# Patient Record
Sex: MALE
[De-identification: names, ages, dates, MRNs, and addresses within clinical notes are randomized per-mention and may not be internally consistent; named-entity substitution may affect disease eponyms.]

---

## 2017-05-31 ENCOUNTER — HOSPITAL ENCOUNTER (EMERGENCY)
Dept: HOSPITAL 42 - ED | Age: 22
Discharge: HOME | End: 2017-05-31
Payer: COMMERCIAL

## 2017-05-31 VITALS
HEART RATE: 70 BPM | OXYGEN SATURATION: 98 % | SYSTOLIC BLOOD PRESSURE: 118 MMHG | DIASTOLIC BLOOD PRESSURE: 76 MMHG | RESPIRATION RATE: 18 BRPM

## 2017-05-31 VITALS — BODY MASS INDEX: 22.1 KG/M2

## 2017-05-31 VITALS — TEMPERATURE: 98.3 F

## 2017-05-31 DIAGNOSIS — H10.213: Primary | ICD-10-CM

## 2017-05-31 NOTE — ED PDOC
Arrival/HPI





- General


Chief Complaint: Eye Problem


Time Seen by Provider: 05/31/17 02:28


Historian: Patient





- History of Present Illness


Narrative History of Present Illness (Text): 


05/31/17 02:34


Gibran Aj is a 21 year old male who presents to the emergency department 

complaining of bilateral eye irritation. Patient states while at home tonight 

some one sprayed air freshener and it landed in his eyes. Patient states he 

began experiencing bilateral eye irritation with vision blurriness. Patient 

states he washed his eyes with water at home and vision blurriness resolved but 

eye irritation is still present. Patient denies any eye discharge, headache, 

dizziness, fever, chills, nausea, vomiting, or any other complaints.


Time/Duration: Other (tonight)


Symptom Onset: Gradual


Symptom Course: Unchanged


Activities at Onset: Light


Context: Home





Past Medical History





- Provider Review


Nursing Documentation Reviewed: Yes





- Past History


Past History: No Previous





- Tetanus Immunization


Tetanus Immunization: Unknown





- Musculoskeletal/Rheumatological


Hx Musculoskeletal Disorders: Yes


Hx Back Pain: Yes





- Psychiatric


Hx Substance Use: Yes (marijuana occasional)





- Past Surgical History


Past Surgical History: No Previous





- Suicidal Assessment


Feels Threatened In Home Enviroment: No





Family/Social History





- Physician Review


Nursing Documentation Reviewed: Yes


Family/Social History: No Known Family HX


Smoking Status: Current Some Days Smoker


Hx Alcohol Use: Yes


Frequency of alcohol use: Socially


Hx Substance Use: Yes (marijuana occasional)


Hx Substance Use Treatment: No





Allergies/Home Meds


Allergies/Adverse Reactions: 


Allergies





erythromycin base Allergy (Verified 02/15/17 11:53)


 RASH


Penicillins Allergy (Verified 02/15/17 11:53)


 RASH











Review of Systems





- Physician Review


All systems were reviewed & negative as marked: Yes





- Review of Systems


Constitutional: Normal.  absent: Fevers


Eyes: Vision Changes (+blurriness), Other (+bilateral eye irritation)


ENT: Normal


Respiratory: Normal.  absent: SOB, Cough


Cardiovascular: Normal.  absent: Chest Pain


Gastrointestinal: Normal.  absent: Abdominal Pain, Diarrhea, Nausea, Vomiting


Genitourinary Male: Normal.  absent: Dysuria, Frequency, Hematuria, Urinary 

Output Changes


Musculoskeletal: Normal.  absent: Back Pain, Neck Pain


Skin: Normal.  absent: Rash


Neurological: Normal.  absent: Headache, Dizziness


Endocrine: Normal


Hemo/Lymphatic: Normal


Psychiatric: Normal





Physical Exam


Vital Signs Reviewed: Yes


Vital Signs











  Temp Pulse Resp BP Pulse Ox


 


 05/31/17 01:51  98.3 F  73  16  125/75  97











Temperature: Afebrile


Blood Pressure: Normal


Pulse: Regular


Respiratory Rate: Normal


Appearance: Positive for: Well-Appearing, Non-Toxic, Comfortable


Pain Distress: None


Mental Status: Positive for: Alert and Oriented X 3





- Systems Exam


Head: Present: Atraumatic, Normocephalic


Pupils: Present: PERRL


Extroacular Muscles: Present: EOMI


Conjunctiva: Present: Normal


Mouth: Present: Moist Mucous Membranes


Neck: Present: Normal Range of Motion.  No: Meningeal Signs, MIDLINE TENDERNESS

, Paraspinal Tenderness


Respiratory/Chest: Present: Clear to Auscultation, Good Air Exchange.  No: 

Respiratory Distress, Accessory Muscle Use


Cardiovascular: Present: Regular Rate and Rhythm, Normal S1, S2.  No: Murmurs


Neurological: Present: GCS=15, CN II-XII Intact, Speech Normal


Skin: Present: Warm, Dry, Normal Color.  No: Rashes


Psychiatric: Present: Alert, Oriented x 3, Normal Insight, Normal Concentration





Medical Decision Making


ED Course and Treatment: 


05/31/17 02:34


Impression:


21 year old male complaining of bilateral eye irritation.





Plan:


-- Reassess and disposition





Progress Notes:


Tetracaine applied and eyes were thoroughly flushed with normal saline. Pt 

tolerated procedure well without complications.





- Scribe Statement


The provider has reviewed the documentation as recorded by the Scribe


Dagmar Etienne


All medical record entries made by the Scribe were at my direction and 

personally dictated by me. I have reviewed the chart and agree that the record 

accurately reflects my personal performance of the history, physical exam, 

medical decision making, and the department course for this patient. I have 

also personally directed, reviewed, and agree with the discharge instructions 

and disposition.








Disposition/Present on Arrival





- Present on Arrival


Any Indicators Present on Arrival: No


History of DVT/PE: No


History of Uncontrolled Diabetes: No


Urinary Catheter: No


History of Decub. Ulcer: No


History Surgical Site Infection Following: None





- Disposition


Have Diagnosis and Disposition been Completed?: Yes


Diagnosis: 


 Chemical conjunctivitis of both eyes





Disposition: HOME/ ROUTINE


Disposition Time: 03:37


Patient Plan: Discharge


Condition: GOOD


Additional Instructions: 


Medication as prescribed/follow up with the opthalmologist this week 


Prescriptions: 


Tobramycin 0.3% [Tobrex 0.3% Ophth Soln] 1 - 2 drop OU QID #1 bottle


Referrals: 


Surinder Faith MD [Staff Provider] - Follow up with primary

## 2017-08-21 ENCOUNTER — HOSPITAL ENCOUNTER (EMERGENCY)
Dept: HOSPITAL 42 - ED | Age: 22
Discharge: HOME | End: 2017-08-21
Payer: COMMERCIAL

## 2017-08-21 VITALS — TEMPERATURE: 98.1 F

## 2017-08-21 VITALS
DIASTOLIC BLOOD PRESSURE: 87 MMHG | RESPIRATION RATE: 16 BRPM | SYSTOLIC BLOOD PRESSURE: 132 MMHG | HEART RATE: 72 BPM | OXYGEN SATURATION: 97 %

## 2017-08-21 VITALS — BODY MASS INDEX: 19.2 KG/M2

## 2017-08-21 DIAGNOSIS — F19.10: Primary | ICD-10-CM

## 2017-08-21 LAB
ALBUMIN/GLOB SERPL: 1.6 {RATIO} (ref 1.1–1.8)
ALP SERPL-CCNC: 43 U/L (ref 38–133)
ALT SERPL-CCNC: 35 U/L (ref 7–56)
APPEARANCE UR: CLEAR
AST SERPL-CCNC: 25 U/L (ref 15–59)
BASOPHILS # BLD AUTO: 0.02 K/MM3 (ref 0–2)
BASOPHILS NFR BLD: 0.2 % (ref 0–3)
BILIRUB SERPL-MCNC: 0.5 MG/DL (ref 0.2–1.3)
BILIRUB UR-MCNC: NEGATIVE MG/DL
BUN SERPL-MCNC: 16 MG/DL (ref 7–21)
CALCIUM SERPL-MCNC: 9.5 MG/DL (ref 8.4–10.5)
CHLORIDE SERPL-SCNC: 101 MMOL/L (ref 95–110)
CO2 SERPL-SCNC: 27 MMOL/L (ref 21–33)
COLOR UR: YELLOW
EOSINOPHIL # BLD: 0.2 10*3/UL (ref 0–0.7)
EOSINOPHIL NFR BLD: 2.3 % (ref 1.5–5)
ERYTHROCYTE [DISTWIDTH] IN BLOOD BY AUTOMATED COUNT: 12.6 % (ref 11.5–14.5)
GLOBULIN SER-MCNC: 2.9 GM/DL
GLUCOSE SERPL-MCNC: 82 MG/DL (ref 70–110)
GLUCOSE UR STRIP-MCNC: NEGATIVE MG/DL
GRANULOCYTES # BLD: 3.65 10*3/UL (ref 1.4–6.5)
GRANULOCYTES NFR BLD: 39.5 % (ref 50–68)
HCT VFR BLD CALC: 42.6 % (ref 42–52)
KETONES UR STRIP-MCNC: NEGATIVE MG/DL
LEUKOCYTE ESTERASE UR-ACNC: NEGATIVE LEU/UL
LYMPHOCYTES # BLD: 4.6 10*3/UL (ref 1.2–3.4)
LYMPHOCYTES NFR BLD AUTO: 49.6 % (ref 22–35)
MCH RBC QN AUTO: 30.7 PG (ref 25–35)
MCHC RBC AUTO-ENTMCNC: 34 G/DL (ref 31–37)
MCV RBC AUTO: 90.3 FL (ref 80–105)
MONOCYTES # BLD AUTO: 0.8 10*3/UL (ref 0.1–0.6)
MONOCYTES NFR BLD: 8.4 % (ref 1–6)
PH UR STRIP: 6 [PH] (ref 4.7–8)
PLATELET # BLD: 311 10^3/UL (ref 120–450)
PMV BLD AUTO: 8.7 FL (ref 7–11)
POTASSIUM SERPL-SCNC: 3.9 MMOL/L (ref 3.6–5)
PROT SERPL-MCNC: 7.5 G/DL (ref 5.8–8.3)
PROT UR STRIP-MCNC: NEGATIVE MG/DL
RBC # UR STRIP: NEGATIVE /UL
SODIUM SERPL-SCNC: 140 MMOL/L (ref 132–148)
SP GR UR STRIP: 1.01 (ref 1–1.03)
UROBILINOGEN UR STRIP-ACNC: 0.2 E.U./DL
WBC # BLD AUTO: 9.2 10^3/UL (ref 4.5–11)

## 2017-08-21 NOTE — ED PDOC
Arrival/HPI





- General


Chief Complaint: Substance Abuse


Time Seen by Provider: 08/21/17 18:28


Historian: Patient, Police





- History of Present Illness


Narrative History of Present Illness (Text): 


08/21/17 18:52


A 21 year old male was brought into the emergency department by [facility] 

for further evaluation. Patient was found by police sleeping in his car. 

Patient reports he was smoking marijuana and felt tired. Patient denies any 

physical complaints. Patient denies any fever, chills, nausea, vomiting, 

abdominal pain, chest pain, shortness of breath, suicidal ideation, homicidal 

ideation or any other complaints. 





Time/Duration: Prior to Arrival





Past Medical History





- Provider Review


Nursing Documentation Reviewed: Yes





- Past History


Past History: No Previous





- Infectious Disease


Hx of Infectious Diseases: None





- Tetanus Immunization


Tetanus Immunization: Unknown





- Musculoskeletal/Rheumatological


Hx Musculoskeletal Disorders: Yes


Hx Back Pain: Yes





- Psychiatric


Hx Depression: No


Hx Emotional Abuse: No


Hx Physical Abuse: No


Hx Substance Use: Yes (marijuana occasional)





- Past Surgical History


Past Surgical History: No Previous





- Anesthesia


Hx Anesthesia: No





- Suicidal Assessment


Feels Threatened In Home Enviroment: No





Family/Social History





- Physician Review


Nursing Documentation Reviewed: Yes


Family/Social History: No Known Family HX


Smoking Status: Current Some Days Smoker


Hx Alcohol Use: Yes


Frequency of alcohol use: Socially


Hx Substance Use: Yes (marijuana occasional)


Hx Substance Use Treatment: No





Allergies/Home Meds


Allergies/Adverse Reactions: 


Allergies





erythromycin base Allergy (Verified 02/15/17 11:53)


 RASH


Penicillins Allergy (Verified 02/15/17 11:53)


 RASH








Home Medications: 


 Home Meds











 Medication  Instructions  Recorded  Confirmed


 


No Known Home Med  08/21/17 08/21/17














Review of Systems





- Physician Review


All systems were reviewed & negative as marked: Yes





- Review of Systems


Constitutional: Normal.  absent: Fevers, Night Sweats


Respiratory: absent: SOB


Cardiovascular: absent: Chest Pain


Gastrointestinal: absent: Abdominal Pain, Nausea, Vomiting


Psychiatric: absent: Suicidal Ideation (/Homicidal ideation)





Physical Exam


Vital Signs











  Temp Pulse Resp BP Pulse Ox


 


 08/21/17 18:59  98.1 F  75  20  134/78  99











Appearance: Positive for: Well-Appearing, Non-Toxic, Comfortable


Pain Distress: None


Mental Status: Positive for: Alert and Oriented X 3, other (Mildly somnolent)





- Systems Exam


Head: Present: Atraumatic, Normocephalic


Pupils: Present: Other (mildly constricted)


Extroacular Muscles: Present: EOMI


Conjunctiva: Present: Normal


Mouth: Present: Moist Mucous Membranes


Neck: Present: Normal Range of Motion


Respiratory/Chest: Present: Clear to Auscultation, Good Air Exchange.  No: 

Respiratory Distress, Accessory Muscle Use


Cardiovascular: Present: Regular Rate and Rhythm, Normal S1, S2.  No: Murmurs


Abdomen: Present: Normal Bowel Sounds.  No: Tenderness, Distention, Peritoneal 

Signs


Back: Present: Normal Inspection


Upper Extremity: Present: Normal Inspection.  No: Cyanosis, Edema


Lower Extremity: Present: Normal Inspection.  No: Edema


Neurological: Present: GCS=15, CN II-XII Intact, Speech Normal


Skin: Present: Warm, Dry, Normal Color.  No: Rashes


Psychiatric: Present: Alert, Oriented x 3, Normal Insight, Normal Concentration

, Other (mildly somnolent)





Medical Decision Making


ED Course and Treatment: 


08/21/17 18:52


Impression:


A 21 year old male brought in by [facility] for further evaluation. Patient 

was smoking marijuana and was found sleeping in his car.





Plan:


-- Labs


-- Reassess and disposition





Progress Notes:





08/21/17 22:16


pt observed awkae, alert clinically sober. able to make phone call in er. in 

nad. ambuatlory steady gait. oriented x 3, will d/c. no si/hi/hallucinations





- Lab Interpretations


Lab Results: 








 08/21/17 19:05 





 08/21/17 19:05 





 Lab Results





08/21/17 19:05: Alcohol, Quantitative < 10


08/21/17 19:05: Salicylates < 1 L, Acetaminophen < 10.0 L


08/21/17 19:05: Sodium 140, Potassium 3.9, Chloride 101, Carbon Dioxide 27, 

Anion Gap 16, BUN 16, Creatinine 0.8, Est GFR (African Amer) > 60, Est GFR (Non-

Af Amer) > 60, Random Glucose 82, Calcium 9.5, Total Bilirubin 0.5, AST 25, ALT 

35, Alkaline Phosphatase 43, Total Protein 7.5, Albumin 4.6, Globulin 2.9, 

Albumin/Globulin Ratio 1.6


08/21/17 19:05: WBC 9.2, RBC 4.72, Hgb 14.5, Hct 42.6, MCV 90.3, MCH 30.7, MCHC 

34.0, RDW 12.6, Plt Count 311, MPV 8.7, Gran % 39.5 L, Lymph % (Auto) 49.6 H, 

Mono % (Auto) 8.4 H, Eos % (Auto) 2.3, Baso % (Auto) 0.2, Gran # 3.65, Lymph # 

4.6 H, Mono # 0.8 H, Eos # 0.2, Baso # 0.02, Corrected WBC (Man) Cancelled, 

Neutrophils % (Manual) Cancelled, Band Neutrophils % Cancelled, Lymphocytes % (

Manual) Cancelled, Atypical Lymphs % Cancelled, Monocytes % (Manual) Cancelled, 

Eosinophils % (Manual) Cancelled, Basophils % (Manual) Cancelled, 

Metamyelocytes % Cancelled, Myelocytes % Cancelled, Promyelocytes % Cancelled, 

Nucleated RBC % Cancelled, Hypersegmented Polys Cancelled, Immature Lymphocytes 

Cancelled, Blast Cells Cancelled, Smudge Cells Cancelled, Toxic Granulation 

Cancelled, Dohle Bodies Cancelled, Yemi Rods Cancelled, Platelet Evaluation 

Cancelled, Plt Clumps, EDTA Cancelled, Large Platelets Cancelled, Giant 

Platelets Cancelled, Polychromasia Cancelled, Hypochromasia Cancelled, 

Hyperchromasia Cancelled, Poikilocytosis (manual Cancelled, Basophilic 

Stippling Cancelled, Anisocytosis (manual) Cancelled, Microcytosis (manual) 

Cancelled, Macrocytosis (manual) Cancelled, Spherocytes Cancelled, Sickle Cells 

Cancelled, Target Cells Cancelled, Tear Drop Cells Cancelled, Ovalocytes 

Cancelled, Stomatocytes Cancelled, Helmet Cells Cancelled, [place] Rings Cancelled

, [place] Cells Cancelled, Acanthocytes (Spur) Cancelled, Rouleaux Cancelled, 

Schistocytes Cancelled


08/21/17 18:55: Urine Opiates Screen Negative, Urine Methadone Screen Negative, 

Ur Barbiturates Screen Negative, Ur Phencyclidine Scrn Negative, Ur 

Amphetamines Screen Negative, U Benzodiazepines Scrn Positive H, U Oth Cocaine 

Metabols Negative, U Cannabinoids Screen Positive H


08/21/17 18:55: Urine Color Yellow, Urine Appearance Clear, Urine pH 6.0, Ur 

Specific Gravity 1.015, Urine Protein Negative, Urine Glucose (UA) Negative, 

Urine Ketones Negative, Urine Blood Negative, Urine Nitrate Negative, Urine 

Bilirubin Negative, Urine Urobilinogen 0.2, Ur Leukocyte Esterase Negative








I have reviewed the lab results: Yes





- Scribe Statement


The provider has reviewed the documentation as recorded by the Savageibed Vasquez





Provider Scribe Attestation:


All medical record entries made by the Scribe were at my direction and 

personally dictated by me. I have reviewed the chart and agree that the record 

accurately reflects my personal performance of the history, physical exam, 

medical decision making, and the department course for this patient. I have 

also personally directed, reviewed, and agree with the discharge instructions 

and disposition.








Disposition/Present on Arrival





- Present on Arrival


Any Indicators Present on Arrival: No


History of DVT/PE: No


History of Uncontrolled Diabetes: No


Urinary Catheter: No


History of Decub. Ulcer: No


History Surgical Site Infection Following: None





- Disposition


Have Diagnosis and Disposition been Completed?: Yes


Diagnosis: 


 Substance abuse





Disposition: HOME/ ROUTINE


Disposition Time: 22:17


Patient Problems: 


 Current Active Problems











Problem Status Onset


 


Substance abuse Acute  











Condition: STABLE


Discharge Instructions (ExitCare):  Cannabis Abuse  (ED), Polysubstance Abuse (

ED)


Additional Instructions: 


please follow up with your doctor. return to er with worsening symptoms or 

concerns. 


Referrals: 


[facility] [Outside] - Follow up with primary


[facility] [Non-Staff[facility] - Follow up with primary


Forms:  [employer] (English)

## 2018-08-14 ENCOUNTER — HOSPITAL ENCOUNTER (EMERGENCY)
Dept: HOSPITAL 42 - ED | Age: 23
LOS: 1 days | Discharge: HOME | End: 2018-08-15
Payer: COMMERCIAL

## 2018-08-14 VITALS — BODY MASS INDEX: 19.2 KG/M2

## 2018-08-14 DIAGNOSIS — M54.16: Primary | ICD-10-CM

## 2018-08-14 PROCEDURE — 99282 EMERGENCY DEPT VISIT SF MDM: CPT

## 2018-08-14 PROCEDURE — 96372 THER/PROPH/DIAG INJ SC/IM: CPT

## 2018-08-15 VITALS — OXYGEN SATURATION: 99 % | TEMPERATURE: 98.8 F | RESPIRATION RATE: 18 BRPM

## 2018-08-15 VITALS — HEART RATE: 86 BPM | DIASTOLIC BLOOD PRESSURE: 90 MMHG | SYSTOLIC BLOOD PRESSURE: 142 MMHG

## 2018-08-15 NOTE — ED PDOC
Arrival/HPI





- General


Chief Complaint: Back Pain


Time Seen by Provider: 08/14/18 23:58


Historian: Patient





- History of Present Illness


Narrative History of Present Illness (Text): 





08/15/18 00:05


22 year old male, with no significant past medical history, presents to the 

emergency department complaining of chronic lower back pain that radiates down 

to the right leg. Patient reports he had an MRI done which showed 2 discs. 

Patient tried over the counter medication and muscle relaxers with no relief. 

Patient denies any fever, chills, chest pain, shortness of breath, nausea, 

vomiting, diarrhea, urinary symptoms, neck pain, headache, dizziness, or any 

other complaints. 








Symptom Onset: Gradual


Symptom Course: Unchanged


Activities at Onset: Light


Context: Home





Past Medical History





- Provider Review


Nursing Documentation Reviewed: Yes





- Past History


Past History: No Previous





- Infectious Disease


Hx of Infectious Diseases: None





- Tetanus Immunization


Tetanus Immunization: Unknown





- Musculoskeletal/Rheumatological


Hx Musculoskeletal Disorders: Yes


Hx Back Pain: Yes





- Psychiatric


Hx Depression: No


Hx Emotional Abuse: No


Hx Physical Abuse: No


Hx Substance Use: Yes (marijuana occasional)





- Past Surgical History


Past Surgical History: No Previous





- Anesthesia


Hx Anesthesia: No





- Suicidal Assessment


Feels Threatened In Home Enviroment: No





Family/Social History





- Physician Review


Nursing Documentation Reviewed: Yes


Family/Social History: No Known Family HX


Smoking Status: Current Some Days Smoker


Hx Alcohol Use: Yes


Hx Substance Use: Yes (marijuana occasional)


Hx Substance Use Treatment: No





Allergies/Home Meds


Allergies/Adverse Reactions: 


Allergies





erythromycin base Allergy (Verified 02/15/17 11:53)


 RASH


Penicillins Allergy (Verified 02/15/17 11:53)


 RASH








Home Medications: 


 Home Meds











 Medication  Instructions  Recorded  Confirmed


 


No Known Home Med  08/21/17 08/14/18














Review of Systems





- Physician Review


All systems were reviewed & negative as marked: Yes





- Review of Systems


Constitutional: absent: Fevers, Other (Chills)


Respiratory: absent: SOB


Gastrointestinal: absent: Diarrhea, Vomiting


Genitourinary Male: absent: Dysuria, Frequency, Hematuria


Musculoskeletal: Back Pain (radiates to right leg).  absent: Neck Pain


Neurological: absent: Headache, Dizziness





Physical Exam


Vital Signs Reviewed: Yes


Vital Signs











  Temp Pulse Resp BP Pulse Ox


 


 08/15/18 00:46   86  18  142/90  99


 


 08/15/18 00:05  98.8 F  78  18  148/105 H  99











Temperature: Afebrile


Blood Pressure: Hypertensive


Pulse: Regular


Respiratory Rate: Normal


Appearance: Positive for: Well-Appearing, Non-Toxic, Comfortable


Pain Distress: None


Mental Status: Positive for: Alert and Oriented X 3





- Systems Exam


Head: Present: Atraumatic, Normocephalic


Pupils: Present: PERRL


Extroacular Muscles: Present: EOMI


Conjunctiva: Present: Normal


Mouth: Present: Moist Mucous Membranes


Neck: Present: Normal Range of Motion


Respiratory/Chest: Present: Clear to Auscultation, Good Air Exchange.  No: 

Respiratory Distress, Accessory Muscle Use


Cardiovascular: Present: Regular Rate and Rhythm, Normal S1, S2.  No: Murmurs


Abdomen: No: Tenderness, Distention, Peritoneal Signs


Back: Present: Pain with Leg Raise (more to the right than left )


Upper Extremity: Present: Normal Inspection.  No: Cyanosis, Edema


Lower Extremity: Present: Normal Inspection.  No: Edema


Neurological: Present: GCS=15, CN II-XII Intact, Speech Normal


Skin: Present: Warm, Dry, Normal Color.  No: Rashes


Psychiatric: Present: Alert, Oriented x 3, Normal Insight, Normal Concentration





Medical Decision Making


ED Course and Treatment: 





08/15/18 00:05


Impression:


22 year old male presents complaining of chronic lower back pain that radiates 

to the right leg. 





Plan:


-- Decadron Inj, Percocet, Toradol


-- Reassess and disposition





Prior Visits:


Notes and results from previous visits were reviewed. 





Progress Notes:





08/15/18 00:31


On re-evaluation, patient feels better and is in no acute distress. I have 

discussed the results and plan with the patient, who expresses understanding. 

Patient in agreement with plan to be discharged home. Patient is stable for 

discharge. Patient was instructed to follow up with physician or return if 

symptoms worsen or new concerning symptoms arise. 





- Medication Orders


Current Medication Orders: 











Discontinued Medications





Dexamethasone (Decadron Inj)  10 mg IM STAT STA


   Stop: 08/15/18 00:15


   Last Admin: 08/15/18 00:20  Dose: 10 mg





IM Administration Charges


 Document     08/15/18 00:20  CNR  (Rec: 08/15/18 00:20  CNR  [id number])


     Charges for Administration


      # of IM Administrations                    1





Ketorolac Tromethamine (Toradol)  15 mg IM ONCE ONE


   Stop: 08/15/18 00:15


   Last Admin: 08/15/18 00:20  Dose: 15 mg





MAR Pain Assessment


 Document     08/15/18 00:20  CNR  (Rec: 08/15/18 00:21  CNR  [id number])


     Pain Reassessment


      Is this a pain reassessment?               No


IM Administration Charges


 Document     08/15/18 00:20  CNR  (Rec: 08/15/18 00:21  CNR  [id number])


     Charges for Administration


      # of IM Administrations                    1





Oxycodone/Acetaminophen (Percocet 5/325 Mg Tab)  1 tab PO STAT STA


   Stop: 08/15/18 00:15


   Last Admin: 08/15/18 00:20  Dose: 1 tab





MAR Pain Assessment


 Document     08/15/18 00:20  CNR  (Rec: 08/15/18 00:20  CNR  [id number])


     Pain Reassessment


      Is this a pain reassessment?               No














- Scribe Statement


The provider has reviewed the documentation as recorded by the Sabino Pastor





Provider Scribe Attestation:


All medical record entries made by the Sabino were at my direction and 

personally dictated by me. I have reviewed the chart and agree that the record 

accurately reflects my personal performance of the history, physical exam, 

medical decision making, and the department course for this patient. I have 

also personally directed, reviewed, and agree with the discharge instructions 

and disposition.








Disposition/Present on Arrival





- Present on Arrival


Any Indicators Present on Arrival: No


History of DVT/PE: No


History of Uncontrolled Diabetes: No


Urinary Catheter: No


History of Decub. Ulcer: No


History Surgical Site Infection Following: None





- Disposition


Have Diagnosis and Disposition been Completed?: Yes


Diagnosis: 


 Lumbar radiculopathy





Disposition: HOME/ ROUTINE


Disposition Time: 00:30


Condition: IMPROVED


Discharge Instructions (ExitCare):  Radiculopathy


Referrals: 


Brennan Lowe MD [Staff Provider] - Follow up with primary


Olman Magana MD [Non-Staff] - Follow up with primary


Forms:  [employer] (English)

## 2018-08-27 ENCOUNTER — HOSPITAL ENCOUNTER (EMERGENCY)
Dept: HOSPITAL 42 - ED | Age: 23
Discharge: HOME | End: 2018-08-27
Payer: COMMERCIAL

## 2018-08-27 VITALS
RESPIRATION RATE: 18 BRPM | OXYGEN SATURATION: 99 % | DIASTOLIC BLOOD PRESSURE: 64 MMHG | HEART RATE: 88 BPM | SYSTOLIC BLOOD PRESSURE: 117 MMHG

## 2018-08-27 VITALS — BODY MASS INDEX: 23.6 KG/M2

## 2018-08-27 VITALS — TEMPERATURE: 97.9 F

## 2018-08-27 DIAGNOSIS — M54.17: Primary | ICD-10-CM

## 2018-08-27 PROCEDURE — 99283 EMERGENCY DEPT VISIT LOW MDM: CPT

## 2018-08-27 PROCEDURE — 96372 THER/PROPH/DIAG INJ SC/IM: CPT

## 2018-08-27 NOTE — ED PDOC
Arrival/HPI





- General


Chief Complaint: Lower Extremity Problem/Injury


Time Seen by Provider: 08/27/18 20:04


Historian: Patient





- History of Present Illness


Narrative History of Present Illness (Text): 





08/27/18 21:27





A 22 yea old male, whose past medical history include herniated discs, presents 

to the emergency department s/p fall. Patient stats that his "leg went out" as 

he was walking. He reports that this has been happening more frequently. He 

states that he has been losing strength in his right leg and notes that at 

times it feels numb. The patient denies fevers, chills, headache, dizziness, 

sore throat, cough, chest pain, shortness of breath, dyspnea on exertion, 

abdominal pain, nausea, vomiting, diarrhea, neck pain, urinary/bowel changes or 

any other complaint. 


 


Time/Duration: Other (Today)


Symptom Onset: Sudden


Symptom Course: Unchanged


Activities at Onset: Rest, Light


Context: Home





Past Medical History





- Provider Review


Nursing Documentation Reviewed: Yes





- Past History


Past History: No Previous





- Infectious Disease


Hx of Infectious Diseases: None





- Tetanus Immunization


Tetanus Immunization: Unknown





- Cardiac


Hx Cardiac Disorders: No





- Pulmonary


Hx Respiratory Disorders: No





- Neurological


Hx Neurological Disorder: No





- HEENT


Hx HEENT Disorder: No





- Renal


Hx Renal Disorder: No





- Endocrine/Metabolic


Hx Endocrine Disorders: No





- Hematological/Oncological


Hx Blood Disorders: No





- Integumentary


Hx Dermatological Disorder: No





- Musculoskeletal/Rheumatological


Hx Musculoskeletal Disorders: Yes


Hx Back Pain: Yes (Sciatica, Herniated Disc, Pinch Nerve)





- Gastrointestinal


Hx Gastrointestinal Disorders: No





- Genitourinary/Gynecological


Hx Genitourinary Disorders: No





- Psychiatric


Hx Psychophysiologic Disorder: No


Hx Substance Use: Yes (marijuana occasional)





- Past Surgical History


Past Surgical History: No Previous





- Anesthesia


Hx Anesthesia: No





- Suicidal Assessment


Feels Threatened In Home Enviroment: No





Family/Social History





- Physician Review


Nursing Documentation Reviewed: Yes


Family/Social History: No Known Family HX


Smoking Status: Current Some Days Smoker


Hx Alcohol Use: Yes


Hx Substance Use: Yes (marijuana occasional)


Hx Substance Use Treatment: No





Allergies/Home Meds


Allergies/Adverse Reactions: 


Allergies





erythromycin base Allergy (Verified 02/15/17 11:53)


 RASH


Penicillins Allergy (Verified 02/15/17 11:53)


 RASH


shellfish derived Allergy (Verified 08/27/18 19:47)


 RASH











Review of Systems





- Physician Review


All systems were reviewed & negative as marked: Yes





- Review of Systems


Constitutional: absent: Fevers


Respiratory: absent: SOB, Cough


Cardiovascular: absent: Chest Pain, PRADO


Gastrointestinal: absent: Abdominal Pain, Constipation, Diarrhea, Nausea, 

Vomiting


Genitourinary Male: absent: Urinary Output Changes


Musculoskeletal: Other (Right leg pain and numbness.).  absent: Neck Pain


Neurological: absent: Headache, Dizziness





Physical Exam


Vital Signs Reviewed: Yes


Vital Signs











  Temp Pulse Resp BP Pulse Ox


 


 08/27/18 23:08   88  18  117/64  99


 


 08/27/18 19:50  97.9 F  71  17  119/77  97


 


 08/27/18 19:48  97.9 F  71  17  119/77  97











Temperature: Afebrile


Blood Pressure: Normal


Pulse: Regular


Respiratory Rate: Normal


Appearance: Positive for: Well-Appearing, Non-Toxic, Comfortable


Pain Distress: None


Mental Status: Positive for: Alert and Oriented X 3





- Systems Exam


Head: Present: Atraumatic, Normocephalic


Pupils: Present: PERRL


Extroacular Muscles: Present: EOMI


Conjunctiva: Present: Normal


Mouth: Present: Moist Mucous Membranes


Neck: Present: Normal Range of Motion


Respiratory/Chest: Present: Clear to Auscultation, Good Air Exchange.  No: 

Respiratory Distress, Accessory Muscle Use


Cardiovascular: Present: Regular Rate and Rhythm, Normal S1, S2.  No: Murmurs


Abdomen: No: Tenderness, Distention, Peritoneal Signs


Back: Present: Normal Inspection


Upper Extremity: Present: Normal Inspection.  No: Cyanosis, Edema


Lower Extremity: Present: Other (diminished sensation over right distal thigh 

and right dorsum of the foot. Strength 5/5 bilaterally.).  No: Edema


Neurological: Present: GCS=15, CN II-XII Intact, Speech Normal


Skin: Present: Warm, Dry, Normal Color.  No: Rashes


Psychiatric: Present: Alert, Oriented x 3, Normal Insight, Normal Concentration





Medical Decision Making


ED Course and Treatment: 





08/27/18 21:30





Impression:


A 22 year old male presents to the emergency department s/p fall with a 

complaint of right leg numbness and pain.





Plan:





-- Decadron Inj, Lidoderm, Toradol, Tylenol


-- Reassess and disposition





Prior Visits:


Notes and results from previous visits were reviewed.





Progress Notes:








08/27/18 23:27


patient was seen for acute on chronic low back radiculopathy, no hx direct 

trauma to the spine, no fever, no saddle anesthesia, no hx ivda, no leg weakness

-patient states he falls second to the acute pain spells but does not exhibit 

weakness. patient understands and agrees with discharge and will make the 

appropriate follow up. furthermore,. patient is already seeing a pain 

 for which a "procedure" was being discussed. i did not 

feel comfortable escalating patient's pain medications considering the 

chronicity of the pain.





- Medication Orders


Current Medication Orders: 











Discontinued Medications





Acetaminophen (Tylenol 325mg Tab)  975 mg PO STAT STA


   Stop: 08/27/18 21:40


   Last Admin: 08/27/18 22:24  Dose: 975 mg





Dexamethasone (Decadron Inj)  10 mg IM STAT STA


   Stop: 08/27/18 21:41


   Last Admin: 08/27/18 22:23  Dose: 10 mg





IM Administration Charges


 Document     08/27/18 22:23  CNR  (Rec: 08/27/18 22:23  CNR  [id number])


     Injection Site


      MAR Injection Site                         Left Deltoid


     Charges for Administration


      # of IM Administrations                    1





Ketorolac Tromethamine (Toradol)  60 mg IM STAT STA


   Stop: 08/27/18 21:40


   Last Admin: 08/27/18 22:23  Dose: 60 mg





MAR Pain Assessment


 Document     08/27/18 22:23  CNR  (Rec: 08/27/18 22:24  CNR  [id number])


     Pain Reassessment


      Is this a pain reassessment?               No


IM Administration Charges


 Document     08/27/18 22:23  CNR  (Rec: 08/27/18 22:24  CNR  [id number])


     Injection Site


      MAR Injection Site                         Left Gluteus Scar


     Charges for Administration


      # of IM Administrations                    1





Lidocaine (Lidoderm)  1 ea TD DAILY STA


   Stop: 08/27/18 21:40


   Last Admin: 08/27/18 22:23  Dose: 1 ea





MAR Transdermal Patch Site


 Document     08/27/18 22:23  CNR  (Rec: 08/27/18 22:23  CNR  [id number])


     Transdermal Patch Site


      Transdermal Patch Site                     Right Lower Back














- Scribe Statement


The provider has reviewed the documentation as recorded by the Sabino Adams





Provider Scribe Attestation:


All medical record entries made by the Scribe were at my direction and 

personally dictated by me. I have reviewed the chart and agree that the record 

accurately reflects my personal performance of the history, physical exam, 

medical decision making, and the department course for this patient. I have 

also personally directed, reviewed, and agree with the discharge instructions 

and disposition.








Disposition/Present on Arrival





- Present on Arrival


Any Indicators Present on Arrival: No


History of DVT/PE: No


History of Uncontrolled Diabetes: No


Urinary Catheter: No


History of Decub. Ulcer: No


History Surgical Site Infection Following: None





- Disposition


Have Diagnosis and Disposition been Completed?: Yes


Diagnosis: 


 Radiculopathy of lumbosacral region





Disposition: HOME/ ROUTINE


Disposition Time: 23:30


Patient Plan: Discharge


Condition: STABLE


Discharge Instructions (ExitCare):  Radiculopathy (DC)


Print Language: ENGLISH


Additional Instructions: 


You should consider seeing a spine specialist for the back pain. You can call 

the [facility] number to help with the referral. the spine specialist could be a 

pain management doctor or a spine surgeon.








ACE PRESLEY, thank you for letting us take care of you today. Your 

provider was Dr. Wagner Souza and you were treated for low back pain. The 

emergency medical care you received today was directed at your acute symptoms. 

If you were prescribed any medication, please fill it and take as directed. It 

may take several days for your symptoms to resolve. Return to the Emergency 

Department if your symptoms worsen, do not improve, or if you have any other 

problems.





Please contact your doctor or call one of the physicians/clinics you have been 

referred to that are listed on the Patient Visit Information form that is 

included in your discharge packet. Bring any paperwork you were given at 

discharge with you along with any medications you are taking to your follow up 

visit. Our treatment cannot replace ongoing medical care by a primary care 

provider outside of the emergency department.





Thank you for allowing the [facility] team to be part of your care today.








If you had an X-Ray or CT scan: A Radiologist will review the ED reading if any 

change in treatment is needed we will contact you.***





If you had a blood, urine, or wound culture: It will take several days for the 

results, if any change in treatment is needed we will contact you.***





If you had an STI test: It will take 48 hours for the results. Please call 

after 1 week if you have not heard back.***


Prescriptions: 


Prednisone [Deltasone] 40 mg PO DAILY 7 Days #14 tablet


Referrals: 


 Service [Outside] - Follow up with primary


Ramo Johnson MD [Primary Care Provider] - Follow up with primary


Forms:  [employer] (English)